# Patient Record
Sex: FEMALE | Race: WHITE | NOT HISPANIC OR LATINO | Employment: UNEMPLOYED | URBAN - NONMETROPOLITAN AREA
[De-identification: names, ages, dates, MRNs, and addresses within clinical notes are randomized per-mention and may not be internally consistent; named-entity substitution may affect disease eponyms.]

---

## 2023-06-05 DIAGNOSIS — J44.9 CHRONIC OBSTRUCTIVE PULMONARY DISEASE, UNSPECIFIED COPD TYPE (MULTI): ICD-10-CM

## 2023-06-05 DIAGNOSIS — K21.9 GASTROESOPHAGEAL REFLUX DISEASE, UNSPECIFIED WHETHER ESOPHAGITIS PRESENT: ICD-10-CM

## 2023-06-05 RX ORDER — PANTOPRAZOLE SODIUM 40 MG/1
40 TABLET, DELAYED RELEASE ORAL DAILY
COMMUNITY
End: 2023-06-05 | Stop reason: SDUPTHER

## 2023-06-05 RX ORDER — PANTOPRAZOLE SODIUM 40 MG/1
40 TABLET, DELAYED RELEASE ORAL DAILY
Qty: 90 TABLET | Refills: 1 | Status: SHIPPED | OUTPATIENT
Start: 2023-06-05 | End: 2024-01-16 | Stop reason: SDUPTHER

## 2023-06-05 RX ORDER — ALBUTEROL SULFATE 90 UG/1
2 AEROSOL, METERED RESPIRATORY (INHALATION) EVERY 4 HOURS PRN
Qty: 8.5 G | Refills: 0 | Status: SHIPPED | OUTPATIENT
Start: 2023-06-05 | End: 2024-01-16 | Stop reason: SDUPTHER

## 2023-08-14 PROBLEM — M25.50 MULTIPLE JOINT PAIN: Status: RESOLVED | Noted: 2023-08-14 | Resolved: 2023-08-14

## 2023-08-14 PROBLEM — N12 PYELONEPHRITIS: Status: RESOLVED | Noted: 2023-08-14 | Resolved: 2023-08-14

## 2023-08-14 PROBLEM — R10.9 ABDOMINAL PAIN: Status: RESOLVED | Noted: 2023-08-14 | Resolved: 2023-08-14

## 2023-08-14 PROBLEM — L23.9 ALLERGIC CONTACT DERMATITIS: Status: RESOLVED | Noted: 2023-08-14 | Resolved: 2023-08-14

## 2023-08-14 PROBLEM — M54.2 NECK PAIN: Status: RESOLVED | Noted: 2023-08-14 | Resolved: 2023-08-14

## 2023-08-14 PROBLEM — F17.200 TOBACCO DEPENDENCE: Status: ACTIVE | Noted: 2023-08-14

## 2023-08-14 PROBLEM — R91.8 MULTIPLE LUNG NODULES: Status: RESOLVED | Noted: 2023-08-14 | Resolved: 2023-08-14

## 2023-08-14 PROBLEM — N20.0 KIDNEY STONE ON LEFT SIDE: Status: RESOLVED | Noted: 2023-08-14 | Resolved: 2023-08-14

## 2023-08-14 PROBLEM — J44.9 CHRONIC OBSTRUCTIVE PULMONARY DISEASE (MULTI): Status: ACTIVE | Noted: 2023-08-14

## 2023-08-14 PROBLEM — R10.2 FEMALE PELVIC PAIN: Status: RESOLVED | Noted: 2023-08-14 | Resolved: 2023-08-14

## 2023-08-14 PROBLEM — M19.90 ARTHRITIS: Status: ACTIVE | Noted: 2023-08-14

## 2023-08-14 PROBLEM — L03.90 CELLULITIS: Status: RESOLVED | Noted: 2023-08-14 | Resolved: 2023-08-14

## 2023-08-14 PROBLEM — R25.1 TREMOR: Status: RESOLVED | Noted: 2023-08-14 | Resolved: 2023-08-14

## 2023-08-14 PROBLEM — R76.8 ANA POSITIVE: Status: ACTIVE | Noted: 2023-08-14

## 2023-08-14 PROBLEM — R42 DIZZINESS: Status: RESOLVED | Noted: 2023-08-14 | Resolved: 2023-08-14

## 2023-08-14 PROBLEM — R55 SYNCOPE: Status: RESOLVED | Noted: 2023-08-14 | Resolved: 2023-08-14

## 2023-08-14 PROBLEM — K21.9 GERD (GASTROESOPHAGEAL REFLUX DISEASE): Status: ACTIVE | Noted: 2023-08-14

## 2023-08-14 PROBLEM — N39.0 UTI (URINARY TRACT INFECTION): Status: RESOLVED | Noted: 2023-08-14 | Resolved: 2023-08-14

## 2023-08-14 PROBLEM — F17.200 SMOKER: Status: RESOLVED | Noted: 2023-08-14 | Resolved: 2023-08-14

## 2023-08-14 PROBLEM — K59.00 CONSTIPATION: Status: RESOLVED | Noted: 2023-08-14 | Resolved: 2023-08-14

## 2023-08-14 RX ORDER — FLUTICASONE FUROATE, UMECLIDINIUM BROMIDE AND VILANTEROL TRIFENATATE 100; 62.5; 25 UG/1; UG/1; UG/1
POWDER RESPIRATORY (INHALATION)
COMMUNITY
End: 2024-01-16 | Stop reason: SDUPTHER

## 2023-08-14 RX ORDER — DIVALPROEX SODIUM 125 MG/1
125 TABLET, DELAYED RELEASE ORAL 2 TIMES DAILY
COMMUNITY
End: 2024-03-13 | Stop reason: ALTCHOICE

## 2023-08-14 NOTE — PROGRESS NOTES
"Yas Prakash is a 58 y.o. female who presents for Follow-up (6 months; no longer on meloxicam, replaced with nabubetone; no concerns at this time; \"everything is the same\").    UTI, nephrolithiasis: She has followup with urology (Lynda) in September.    Shoulder pain: Right shoulder pain started a few months ago. She was having issues on the left so right sided issues are new for her. No recent falls, no lifting that she can recall.     Dizziness, syncope: She is planning to see cardio. She has seen Dr. Reyes but her testing is on hold because of issues with her daughter's pregnancy and her 's health.     GERD, hiatal hernia: She gets tightening her in her throat that radiates down to the stomach, sometimes it takes an hour or two to loosen. Started the pantoprazole a year and a half ago. She does get frequent throat clearing. She does also get globus sensation.     COPD: She is using albuterol prn. She is taking care of a few of her grandchildren so she smokes less around them.      Arthritis: 4mo ago went and saw Dr. Barbosa and got switched from meloxicam to nabumetone.      Tremor: Started about more than 3 years ago. Her mother had tremor, so did her grandmother although her grandmother's tremor is thought to be from head trauma.      All other systems have been reviewed and are negative for complaint     Objective   /75 (BP Location: Right arm, Patient Position: Sitting, BP Cuff Size: Large adult)   Pulse 88   Ht 1.575 m (5' 2\")   Wt 74.8 kg (164 lb 12.8 oz)   BMI 30.14 kg/m²     Gen: No acute distress, alert and oriented x3, pleasant   HEENT: moist mucous membranes, b/l external auditory canals are clear of debris, TMs within normal limits, no oropharyngeal lesions, eomi, perrla   Neck: thyroid within normal limits, no lymphadenopathy   CV: RRR, normal S1/S2, no murmur   Resp: Clear to auscultation bilaterally, no wheezes or rhonchi appreciated  Abd: soft, nontender, non-distended, no " guarding/rigidity, bowel sounds present  Extr: no edema, no calf tenderness  Derm: Skin is warm and dry, no rashes appreciated  Psych: mood is good, affect is congruent, good hygiene, normal speech and eye contact  Neuro: cranial nerves grossly intact, normal gait     Assessment/Plan     #Nephrolithiasis  She has followup with urology     #Wrist pain  #Arthritis:  doing reasonably well on meloxicam  concern for MCTD  Following with rheumatology  Currently on nabumetone  Has followup next month     #Dizziness  #Syncope  Resolved  Extensive testing for this was negative  Has followup with new neurologist    #Migraine   Started on depakote through neurology which is helping  Has followup with neurology in a month or two     #Tremor  some concern for parkinsonism, more likely essential tremor  Referring to neuro to discuss     #COPD:  Refilling albuterol today  she got bad SE's from chantix     #GERD:  Controlled on pantoprazole     HCM:  Flu vaccine today  UTD mammogram December  Lipid WNL  C-scope ready after clearance from neuro and cardio, referral placed today

## 2023-08-22 ENCOUNTER — OFFICE VISIT (OUTPATIENT)
Dept: PRIMARY CARE | Facility: CLINIC | Age: 58
End: 2023-08-22
Payer: COMMERCIAL

## 2023-08-22 VITALS
SYSTOLIC BLOOD PRESSURE: 111 MMHG | WEIGHT: 164.8 LBS | BODY MASS INDEX: 30.33 KG/M2 | DIASTOLIC BLOOD PRESSURE: 75 MMHG | HEIGHT: 62 IN | HEART RATE: 88 BPM

## 2023-08-22 DIAGNOSIS — Z12.11 SCREENING FOR COLON CANCER: ICD-10-CM

## 2023-08-22 DIAGNOSIS — Z23 ENCOUNTER FOR IMMUNIZATION: ICD-10-CM

## 2023-08-22 DIAGNOSIS — Z12.31 ENCOUNTER FOR SCREENING MAMMOGRAM FOR MALIGNANT NEOPLASM OF BREAST: Primary | ICD-10-CM

## 2023-08-22 PROCEDURE — 90471 IMMUNIZATION ADMIN: CPT | Performed by: FAMILY MEDICINE

## 2023-08-22 PROCEDURE — 99214 OFFICE O/P EST MOD 30 MIN: CPT | Performed by: FAMILY MEDICINE

## 2023-08-22 PROCEDURE — 90686 IIV4 VACC NO PRSV 0.5 ML IM: CPT | Performed by: FAMILY MEDICINE

## 2023-08-22 RX ORDER — CEFDINIR 300 MG/1
300 CAPSULE ORAL EVERY 12 HOURS
COMMUNITY
Start: 2023-06-27 | End: 2023-08-22 | Stop reason: ALTCHOICE

## 2023-08-22 RX ORDER — RIZATRIPTAN BENZOATE 10 MG/1
TABLET ORAL
COMMUNITY
Start: 2023-07-26 | End: 2024-03-13 | Stop reason: SDUPTHER

## 2023-08-22 RX ORDER — DOCUSATE SODIUM 100 MG/1
100 CAPSULE, LIQUID FILLED ORAL 2 TIMES DAILY
COMMUNITY
Start: 2023-06-27

## 2023-08-22 RX ORDER — MULTIVITAMIN
1 TABLET ORAL DAILY
COMMUNITY

## 2023-08-22 RX ORDER — NABUMETONE 750 MG/1
750 TABLET, FILM COATED ORAL 2 TIMES DAILY PRN
COMMUNITY
Start: 2023-08-13 | End: 2024-04-15 | Stop reason: SDUPTHER

## 2023-08-22 RX ORDER — NORTRIPTYLINE HYDROCHLORIDE 50 MG/1
CAPSULE ORAL
COMMUNITY
Start: 2023-07-25 | End: 2024-03-13 | Stop reason: SDUPTHER

## 2023-08-22 NOTE — PATIENT INSTRUCTIONS
Radiology:  Raz  : [phone]    Mammogram due after 12/29    General Surgery:  Everette Shepard () [phone]

## 2023-12-22 ENCOUNTER — APPOINTMENT (OUTPATIENT)
Dept: PRIMARY CARE | Facility: CLINIC | Age: 58
End: 2023-12-22
Payer: COMMERCIAL

## 2024-01-16 DIAGNOSIS — K21.9 GASTROESOPHAGEAL REFLUX DISEASE, UNSPECIFIED WHETHER ESOPHAGITIS PRESENT: ICD-10-CM

## 2024-01-16 DIAGNOSIS — J44.9 CHRONIC OBSTRUCTIVE PULMONARY DISEASE, UNSPECIFIED COPD TYPE (MULTI): ICD-10-CM

## 2024-01-16 RX ORDER — PANTOPRAZOLE SODIUM 40 MG/1
40 TABLET, DELAYED RELEASE ORAL DAILY
Qty: 90 TABLET | Refills: 1 | Status: SHIPPED | OUTPATIENT
Start: 2024-01-16 | End: 2024-03-13 | Stop reason: SDUPTHER

## 2024-01-16 RX ORDER — ALBUTEROL SULFATE 90 UG/1
2 AEROSOL, METERED RESPIRATORY (INHALATION) EVERY 4 HOURS PRN
Qty: 18 G | Refills: 5 | Status: SHIPPED | OUTPATIENT
Start: 2024-01-16 | End: 2024-01-23 | Stop reason: SDUPTHER

## 2024-01-16 RX ORDER — FLUTICASONE FUROATE, UMECLIDINIUM BROMIDE AND VILANTEROL TRIFENATATE 100; 62.5; 25 UG/1; UG/1; UG/1
POWDER RESPIRATORY (INHALATION)
Qty: 60 EACH | Refills: 5 | Status: SHIPPED | OUTPATIENT
Start: 2024-01-16 | End: 2024-01-23 | Stop reason: SDUPTHER

## 2024-01-23 ENCOUNTER — OFFICE VISIT (OUTPATIENT)
Dept: PULMONOLOGY | Facility: CLINIC | Age: 59
End: 2024-01-23
Payer: COMMERCIAL

## 2024-01-23 VITALS
HEART RATE: 96 BPM | DIASTOLIC BLOOD PRESSURE: 82 MMHG | SYSTOLIC BLOOD PRESSURE: 130 MMHG | BODY MASS INDEX: 30.97 KG/M2 | WEIGHT: 169.3 LBS | OXYGEN SATURATION: 96 %

## 2024-01-23 DIAGNOSIS — J44.9 CHRONIC OBSTRUCTIVE PULMONARY DISEASE, UNSPECIFIED COPD TYPE (MULTI): ICD-10-CM

## 2024-01-23 DIAGNOSIS — F17.200 TOBACCO DEPENDENCE: Primary | ICD-10-CM

## 2024-01-23 PROCEDURE — 99214 OFFICE O/P EST MOD 30 MIN: CPT | Performed by: NURSE PRACTITIONER

## 2024-01-23 RX ORDER — FLUTICASONE FUROATE, UMECLIDINIUM BROMIDE AND VILANTEROL TRIFENATATE 100; 62.5; 25 UG/1; UG/1; UG/1
POWDER RESPIRATORY (INHALATION)
Qty: 60 EACH | Refills: 5 | Status: SHIPPED | OUTPATIENT
Start: 2024-01-23 | End: 2024-03-13 | Stop reason: ALTCHOICE

## 2024-01-23 RX ORDER — GUAIFENESIN 600 MG/1
600 TABLET, EXTENDED RELEASE ORAL 2 TIMES DAILY
Qty: 60 TABLET | Refills: 6 | Status: SHIPPED | OUTPATIENT
Start: 2024-01-23

## 2024-01-23 RX ORDER — ALBUTEROL SULFATE 90 UG/1
2 AEROSOL, METERED RESPIRATORY (INHALATION) EVERY 4 HOURS PRN
Qty: 18 G | Refills: 11 | Status: SHIPPED | OUTPATIENT
Start: 2024-01-23 | End: 2025-01-22

## 2024-01-23 ASSESSMENT — ENCOUNTER SYMPTOMS
COUGH: 1
WHEEZING: 1

## 2024-01-23 NOTE — PROGRESS NOTES
Subjective   Patient ID: Yas Prakash is a 58 y.o. female who presents for No chief complaint on file..  HPI  HPI: New patient here today to establish care. Patient tells me she has had COPD before about 10 years. She used to see [facility] for her treatment. She tells me somewhere between 5 and 10 years ago she did have a bronchoscopy. We are going to call [facility] to get her records. Currently she is not using a maintenance medicine but she used to be on Symbicort. She states that she is short of breath with any exertion. I would like a PFT and a 6-minute walk. Patient has never been on oxygen. She does get frequent pneumonia. I would also like her to have a low-dose chest CT since she is a current smoker.    1/03/23 she is here today for follow-up. She had a PFT which shows an FEV1 of 68% predicted. She was unable to perform the DLCO maneuver. I started her on Trelegy at her last visit she is doing well with that she feels like it is made a difference in her breathing. She has albuterol to use for rescue. We did a chest CT which shows some very small nodules 3 and 4 mm on the left side.    07/10/23 She is here today for follow up. She is remolding her house and it is making her breathing worse. We talked about getting out of the house- when they are doing demo. She was in the ED, had a chest CT. She has kidney stones and was referred to urology. 10 minutes spent preparing patient's chart. 20 minutes spent with patient answering questions and determining care. 10 minutes spent charting and ordering tests and medications    1/23/24 she is still remolding the house but the prashanth parts are finished.         Previous pulmonary history:   She has no history of recurrent infections, or lung disease as a child. She was previously told she has COPD . She currently is on no supplemental oxygen. She has never been to pulmonary rehab. Does not recall having AECOPD requiring antibiotics or prednisone.     Inhalers/nebulized  medications: albuterol      Hospitalization History:  She has not been hospitalized over the last year for breathing related problem.     Sleep history:  Denies snoring, apneas, feeling tired during the day or taking naps during the day. Admits to feeling tired during the day.  Complains of snoring, ? Apneas. Feels tired during the day. Does not take frequent naps. Does not feel tired during the day or take frequent naps.  STOP-BANG score of 3     Comorbidities:    arthritis  GERD  Syncope    SH:  smoking: current  drinking: none  illicit drug use: none     Occupation: (Full questionnaire on exposures obtained, discussed with the patient and scanned to EMR)  worked as   No known exposure to asbestos, silica or beryllium     Family History:  No family history of lung diseases or cancer     Imaging history: (I have personally reviewed the imaging below)     PFTs:  // -> FEV1/FVC ratio/FEV1 (no BD response)/FVC/DLCO /TLC/RV to TLC ratio     6 MWTs: None on record     Review of Systems   Respiratory:  Positive for cough and wheezing.    All other systems reviewed and are negative.      Objective   Physical Exam  Vitals and nursing note reviewed.   Constitutional:       Appearance: Normal appearance. She is obese.   HENT:      Head: Normocephalic.      Nose: Nose normal.   Eyes:      Pupils: Pupils are equal, round, and reactive to light.   Pulmonary:      Effort: Pulmonary effort is normal.   Neurological:      General: No focal deficit present.      Mental Status: She is alert and oriented to person, place, and time. Mental status is at baseline.   Psychiatric:         Mood and Affect: Mood normal.         Behavior: Behavior normal.         Thought Content: Thought content normal.         Judgment: Judgment normal.         Assessment/Plan        # COPD with emphysema: found on PFT   - Patient states she was diagnosed about 10 years ago used to see [facility]. We will call to obtain records  -will screen for A1AT deficiency  in clinic today (genetic screen)  -FEV1 post-bd of , GOLD stage  -At baseline with no active sign of exacerbation (increased dyspnea, cough, sputum or change in sputum characteristic)  -Advanced therapies including LVRS and Lung transplantation.  -Counseled on the role of diet and exercise  -Pulmonary rehab referral made.  -Vaccinations: Yearly influenza vaccines, Pneumoccocal (both PCV13 and PPSV23 for all patients 65 y.o or above)  -Depression score.  -Sleep evaluation as below.  -Echo to evaluate for core pulmonale.  -Does not need oxygen at rest. Oxygen need evaluation with walking and sleep (nighttime oximetry)   -Used to be on Symbicort currently not using a maintenance medicine. I will try Trelegy and she has albuterol   -Order a PFT and 6-minute walk   - Continue the Trelegy 100 and albuterol   07/10/23 she continues with Trelegy, and uses albuterol several times a day right now because they are remodeling  1/23/24 She continues on Trelegy, uses albuterol about every day, depends on the weather. She needs refills today     #Lung nodules   - Small subcentimeter lung nodules noted on chest CT   - She is eligible for another low-dose chest CT screen in 1 year 10/24    # Smoking cessation:  -Counseled for 4 minutes.  -Patient is willing to quit.  -No stop date scheduled  -will readdress with each visit    - Patient is eligible for a low-dose chest CT screen    Mrs. Prakash it was a pleasure seeing you in the office today. We discussed the following:      - You are eligible for a low-dose chest CT in 1 year 10/24    - Please continue your Trelegy once a day   - Please use your albuterol for shortness of breath   - Please work on quitting smoking    Follow-up in 6 months        DOLORES Mcpherson-KAMALJIT 01/23/24 1:08 PM

## 2024-01-23 NOTE — PATIENT INSTRUCTIONS
Mrs. Prakash it was a pleasure seeing you in the office today. We discussed the following:      - You are eligible for a low-dose chest CT in 1 year 10/24    - Please continue your Trelegy once a day   - Please use your albuterol for shortness of breath   - Please work on quitting smoking    Follow-up in 6 months

## 2024-02-19 ENCOUNTER — HOSPITAL ENCOUNTER (OUTPATIENT)
Dept: RADIOLOGY | Facility: HOSPITAL | Age: 59
Discharge: HOME | End: 2024-02-19
Payer: COMMERCIAL

## 2024-02-19 DIAGNOSIS — Z12.31 ENCOUNTER FOR SCREENING MAMMOGRAM FOR MALIGNANT NEOPLASM OF BREAST: ICD-10-CM

## 2024-02-19 PROCEDURE — 77063 BREAST TOMOSYNTHESIS BI: CPT | Mod: BILATERAL PROCEDURE | Performed by: RADIOLOGY

## 2024-02-19 PROCEDURE — 77067 SCR MAMMO BI INCL CAD: CPT

## 2024-02-19 PROCEDURE — 77067 SCR MAMMO BI INCL CAD: CPT | Mod: BILATERAL PROCEDURE | Performed by: RADIOLOGY

## 2024-03-06 NOTE — PROGRESS NOTES
Yas Prakash is a 58 y.o. female who presents for Follow-up (4 months; she's having constipation and cramping, painful when she does go and has some blood; now seeing Dr Mclaughlin in place of Eric, has had some med changes)    Constipation: Going on for 4 months. She is having trouble having bowel movements. Sometimes she has to really push to have a BM. Feels like sharp needles. Stools are very hard. Sometimes has blood when she has a bowel movement. Mostly after wiping. She is only having a bowel movement once a week. She tried the chocolate ex lax without relief.     UTI, nephrolithiasis: She has followup with urology (Lynda) in September. She is on vesicare now for her bladder. Feels it is extremely helpful.     Shoulder pain: Right shoulder pain started a few months ago. She was having issues on the left so right sided issues are new for her. No recent falls, no lifting that she can recall.     Hadaches: She ran out of depakote a while ago. Hasn't gotten in with a new neurologist because Dr. Reyes left. Currently working on some dental issues, feels that is contributing to her HA. She is also getting HA triggered by certain hairstyles and her glasses rubbing on her nose. She was on nortriptyline as well at bedtime for anxiety/insomnia.      Dizziness, syncope: She is planning to see cardio. She has seen Dr. Reyes but her testing is on hold because of issues with her daughter's pregnancy and her 's health.      GERD, hiatal hernia: She is taking pantoprazole daily. It was initially very helpful but now symptoms are getting worse, pantoprazole is not working. She is getting chest tightness that is relieved with jeffrey seltzer plus.     COPD: She is using albuterol prn. She is taking care of a few of her grandchildren so she smokes less around them.      Arthritis: 4mo ago went and saw Dr. Barbosa and got switched from meloxicam to nabumetone. Nabumetone is helpful, she has followup next month.     "  Tremor: Started about more than 3 years ago. Her mother had tremor, so did her grandmother although her grandmother's tremor is thought to be from head trauma.      All other systems have been reviewed and are negative for complaint     Objective   /79 (BP Location: Left arm, Patient Position: Sitting, BP Cuff Size: Adult)   Pulse 82   Ht 1.575 m (5' 2\")   Wt 74.8 kg (165 lb)   BMI 30.18 kg/m²     Gen: No acute distress, alert and oriented x3, pleasant   HEENT: moist mucous membranes, b/l external auditory canals are clear of debris, TMs within normal limits, no oropharyngeal lesions, eomi, perrla   Neck: thyroid within normal limits, no lymphadenopathy   CV: RRR, normal S1/S2, no murmur   Resp: Clear to auscultation bilaterally, no wheezes or rhonchi appreciated  Abd: soft, diffuse mild tenderness to palpation, moderate stool burden noted  Extr: no edema, no calf tenderness  Derm: Skin is warm and dry, no rashes appreciated  Psych: mood is good, affect is congruent, good hygiene, normal speech and eye contact  Neuro: cranial nerves grossly intact, normal gait    Assessment/Plan     #Constipation  Rx sent miralax  If no improvement in 1 mo consider enema    #Nephrolithiasis  She has followup with urology    #OAB  On vesicare which is helping     #Wrist pain  #Arthritis:  doing reasonably well on meloxicam  concern for MCTD  Following with rheumatology  Currently on nabumetone  Has followup next month    #GERD:  Hiatal hernia:  Not controlled on PPI  Increasing to BID  If this doesn't control her symptoms we will have to have her see a surgeon     #Dizziness  #Syncope  Resolved  Extensive testing for this was negative  Has followup with new neurologist     #Migraine   Started on depakote through neurology which is helping  Has followup with neurology in a month or two     #Tremor  some concern for parkinsonism, more likely essential tremor  Referring to neuro to discuss     #COPD:  Refilling albuterol " today  she got bad SE's from chantix     HCM:  UTD for flu  Mammogram February WNL  Lipid WNL  C-scope ready after clearance from neuro and cardio, referral placed today

## 2024-03-13 ENCOUNTER — OFFICE VISIT (OUTPATIENT)
Dept: PRIMARY CARE | Facility: CLINIC | Age: 59
End: 2024-03-13
Payer: COMMERCIAL

## 2024-03-13 VITALS
WEIGHT: 165 LBS | DIASTOLIC BLOOD PRESSURE: 79 MMHG | BODY MASS INDEX: 30.36 KG/M2 | SYSTOLIC BLOOD PRESSURE: 140 MMHG | HEART RATE: 82 BPM | HEIGHT: 62 IN

## 2024-03-13 DIAGNOSIS — K21.9 GASTROESOPHAGEAL REFLUX DISEASE, UNSPECIFIED WHETHER ESOPHAGITIS PRESENT: ICD-10-CM

## 2024-03-13 DIAGNOSIS — K59.00 CONSTIPATION, UNSPECIFIED CONSTIPATION TYPE: ICD-10-CM

## 2024-03-13 DIAGNOSIS — G43.809 OTHER MIGRAINE WITHOUT STATUS MIGRAINOSUS, NOT INTRACTABLE: Primary | ICD-10-CM

## 2024-03-13 PROCEDURE — 99214 OFFICE O/P EST MOD 30 MIN: CPT | Performed by: FAMILY MEDICINE

## 2024-03-13 RX ORDER — BUDESONIDE, GLYCOPYRROLATE, AND FORMOTEROL FUMARATE 160; 9; 4.8 UG/1; UG/1; UG/1
AEROSOL, METERED RESPIRATORY (INHALATION) 2 TIMES DAILY
COMMUNITY

## 2024-03-13 RX ORDER — POLYETHYLENE GLYCOL 3350 17 G/17G
8.5 POWDER, FOR SOLUTION ORAL DAILY
Qty: 15 PACKET | Refills: 1 | Status: SHIPPED | OUTPATIENT
Start: 2024-03-13 | End: 2024-05-12

## 2024-03-13 RX ORDER — NORTRIPTYLINE HYDROCHLORIDE 50 MG/1
50 CAPSULE ORAL NIGHTLY
Qty: 90 CAPSULE | Refills: 3 | Status: SHIPPED | OUTPATIENT
Start: 2024-03-13 | End: 2025-03-13

## 2024-03-13 RX ORDER — RIZATRIPTAN BENZOATE 10 MG/1
10 TABLET ORAL ONCE AS NEEDED
Qty: 9 TABLET | Refills: 1 | Status: SHIPPED | OUTPATIENT
Start: 2024-03-13 | End: 2024-05-12

## 2024-03-13 RX ORDER — PANTOPRAZOLE SODIUM 40 MG/1
40 TABLET, DELAYED RELEASE ORAL 2 TIMES DAILY
Qty: 180 TABLET | Refills: 3 | Status: SHIPPED | OUTPATIENT
Start: 2024-03-13 | End: 2025-03-13

## 2024-03-13 NOTE — PATIENT INSTRUCTIONS
Gastroenterology:  Dontae Bauer ([facility]) [phone]  Dontae Maya ([facility]) [phone]

## 2024-04-15 ENCOUNTER — OFFICE VISIT (OUTPATIENT)
Dept: RHEUMATOLOGY | Facility: CLINIC | Age: 59
End: 2024-04-15
Payer: COMMERCIAL

## 2024-04-15 VITALS — BODY MASS INDEX: 30 KG/M2 | DIASTOLIC BLOOD PRESSURE: 78 MMHG | SYSTOLIC BLOOD PRESSURE: 126 MMHG | WEIGHT: 164 LBS

## 2024-04-15 DIAGNOSIS — R76.8 ANA POSITIVE: ICD-10-CM

## 2024-04-15 DIAGNOSIS — M19.90 ARTHRITIS: Primary | ICD-10-CM

## 2024-04-15 PROCEDURE — 99215 OFFICE O/P EST HI 40 MIN: CPT | Performed by: INTERNAL MEDICINE

## 2024-04-15 RX ORDER — NABUMETONE 750 MG/1
750 TABLET, FILM COATED ORAL 2 TIMES DAILY PRN
Qty: 180 TABLET | Refills: 1 | Status: SHIPPED | OUTPATIENT
Start: 2024-04-15

## 2024-04-15 NOTE — PROGRESS NOTES
"Recheck  OA  /  + CORAL  doing well     Poor historian  HPI - \"Pretty good, I guess\"  She has a lot of cramping, mare in her hands.  She says her primary just told her that she has a hernia ?hiatal hernia.  She has a lot of GI sx - her primary incr her PPI to PPI.  She states her primary told her that's what's causing her CP because things are lodging in her esophagus.  She sometimes has dysphagia.  She has not seen GI.  \"I knew I had a 50% change of the arthritis because it's genetic\"  She was taking nabumetone bid but ran out \"quite awhile ago\" and couldn't find our number.  (I gave her 30d supply with 3 refills 1 yr ago).  She takes excedrin, which doesn't help too much.  She says that her hands are swollen 0 she can tell because it's hard to get bracelets over her hands.  AM stiffness x 20 min.  +freq SOB - has COPD and see pulm.  She gets \"major migraines\" but doesn't see neuro any more because she retired.  She said that she was [facility] to [facility].   +dry mouth.  No mouth sores.  No raynauds.  She gets rashes on arms and lower abd \"right now it's scarring over.  I itched so bad, I can't handle pain, so at the slightest, I scratch so bad\"  \"I don't handle pain at all\"  It's unclear what the rash was like \"it's just a scar for the digging\"  \"it seem like I just sniff anything, I break out\"  She states her primary gave her a cream for this, but I cannot find it in her chart, nor can I find mention of rash in primary notes going back at least to 2022    PE  NAD  RRR no r/m/g  CTA  No edema  No rash  No synovitis  3 FM tender pts  + heberdons    A/P - OA and +CORAL without evidence of inflam arthritis or CTD.  She ran out of nabumetone months ago but states she couldn't get our phone # - Restart nabumetone  ?dysphagia - consider GI if sx incr  ?Rash - ?if she gets a rash, just has itching, just picks, etc.  No current rash.  Consider derm if rash recurs  Migraines - declined referral to neuro  Check " yearly labs  Reeval 6 mo or sooner PRN

## 2024-06-19 NOTE — PROGRESS NOTES
Subjective   Patient ID: Yas Prakash is a 59 y.o. female who presents for Follow-up (3 months; miralax is helping).    Constipation: Going on for 4 months. She is having trouble having bowel movements. Sometimes she has to really push to have a BM. Feels like sharp needles. Stools are very hard. Sometimes has blood when she has a bowel movement. Mostly after wiping. She is only having a bowel movement once a week. She tried the chocolate ex lax without relief.      UTI, nephrolithiasis: She has followup with urology (Lynda) in September. She is on vesicare now for her bladder. Feels it is extremely helpful.     Shoulder pain: Right shoulder pain started a few months ago. She was having issues on the left so right sided issues are new for her. No recent falls, no lifting that she can recall.      Hadaches: did well on depakote but neurologist left. She has riztriptan prn =. She gets 18 to 20 HA days per month. Rizatriptan prn is helpful (95 percent reduction) and excedrine helps take it the rest of the way.      Dizziness, syncope: She is planning to see cardio. She has seen Dr. Reyes but her testing is on hold because of issues with her daughter's pregnancy and her 's health.      GERD, hiatal hernia: She is taking pantoprazole daily. It was initially very helpful but now symptoms are getting worse, pantoprazole is not working. She is getting chest tightness that is relieved with jeffrey seltzer plus.     COPD: She is using albuterol prn. She is taking care of a few of her grandchildren so she smokes less around them.      Arthritis: 4mo ago went and saw Dr. Barbosa and got switched from meloxicam to nabumetone. Nabumetone is helpful, she has followup next month.      Tremor: Started about more than 3 years ago. Her mother had tremor, so did her grandmother although her grandmother's tremor is thought to be from head trauma.      All other systems have been reviewed and are negative for complaint  "    Objective   /87 (BP Location: Left arm, Patient Position: Sitting, BP Cuff Size: Adult)   Pulse 105   Ht 1.575 m (5' 2\")   Wt 74.8 kg (165 lb)   BMI 30.18 kg/m²     Gen: No acute distress, alert and oriented x3, pleasant   HEENT: moist mucous membranes, b/l external auditory canals are clear of debris, TMs within normal limits, no oropharyngeal lesions, eomi, perrla   Neck: thyroid within normal limits, no lymphadenopathy   CV: RRR, normal S1/S2, no murmur   Resp: Clear to auscultation bilaterally, no wheezes or rhonchi appreciated  Abd: soft, nontender, non-distended, no guarding/rigidity, bowel sounds present  Extr: no edema, no calf tenderness  Derm: Skin is warm and dry, no rashes appreciated  Psych: mood is good, affect is congruent, good hygiene, normal speech and eye contact  Neuro: cranial nerves grossly intact, normal gait    Assessment/Plan   #Constipation  Doing well on miralax     #Nephrolithiasis  She has followup with urology  She has testing upcoming for urology     #OAB  On vesicare which is helping     #Wrist pain  #Arthritis:  doing reasonably well on meloxicam  concern for MCTD  Following with Dr. Reyes  Currently on nabumetone  Has followup next month     #GERD:  Hiatal hernia:  Not controlled on PPI  Increasing to BID  If this doesn't control her symptoms we will have to have her see a surgeon     #Dizziness  #Syncope  Resolved  Extensive testing for this was negative  Has followup with new neurologist     #Migraine   She is taking rizatriptan prn which helps     #Tremor  some concern for parkinsonism, more likely essential tremor  Referring to neuro to discuss     #COPD:  Refilling albuterol today  she got bad SE's from chantix     HCM:  UTD for flu  Mammogram February WNL  Lipid WNL  C-scope ready after clearance from neuro and cardio, referral placed today  "

## 2024-06-25 ENCOUNTER — APPOINTMENT (OUTPATIENT)
Dept: PRIMARY CARE | Facility: CLINIC | Age: 59
End: 2024-06-25
Payer: COMMERCIAL

## 2024-06-25 VITALS
HEART RATE: 105 BPM | HEIGHT: 62 IN | BODY MASS INDEX: 30.36 KG/M2 | DIASTOLIC BLOOD PRESSURE: 87 MMHG | SYSTOLIC BLOOD PRESSURE: 128 MMHG | WEIGHT: 165 LBS

## 2024-06-25 DIAGNOSIS — K21.9 GASTROESOPHAGEAL REFLUX DISEASE, UNSPECIFIED WHETHER ESOPHAGITIS PRESENT: ICD-10-CM

## 2024-06-25 DIAGNOSIS — Z13.220 SCREENING FOR HYPERLIPIDEMIA: ICD-10-CM

## 2024-06-25 DIAGNOSIS — Z00.00 HEALTHCARE MAINTENANCE: Primary | ICD-10-CM

## 2024-06-25 PROCEDURE — 99214 OFFICE O/P EST MOD 30 MIN: CPT | Performed by: FAMILY MEDICINE

## 2024-06-25 RX ORDER — PANTOPRAZOLE SODIUM 40 MG/1
40 TABLET, DELAYED RELEASE ORAL 2 TIMES DAILY
Qty: 180 TABLET | Refills: 3 | Status: SHIPPED | OUTPATIENT
Start: 2024-06-25 | End: 2025-06-25

## 2024-07-09 ENCOUNTER — APPOINTMENT (OUTPATIENT)
Dept: PULMONOLOGY | Facility: CLINIC | Age: 59
End: 2024-07-09
Payer: COMMERCIAL

## 2024-07-16 ENCOUNTER — APPOINTMENT (OUTPATIENT)
Dept: PULMONOLOGY | Facility: CLINIC | Age: 59
End: 2024-07-16
Payer: COMMERCIAL

## 2024-07-16 DIAGNOSIS — G43.809 OTHER MIGRAINE WITHOUT STATUS MIGRAINOSUS, NOT INTRACTABLE: ICD-10-CM

## 2024-07-16 RX ORDER — RIZATRIPTAN BENZOATE 10 MG/1
10 TABLET ORAL ONCE AS NEEDED
Qty: 9 TABLET | Refills: 1 | Status: SHIPPED | OUTPATIENT
Start: 2024-07-16 | End: 2024-09-14

## 2024-08-07 DIAGNOSIS — J44.9 CHRONIC OBSTRUCTIVE PULMONARY DISEASE, UNSPECIFIED COPD TYPE (MULTI): ICD-10-CM

## 2024-08-07 RX ORDER — ALBUTEROL SULFATE 90 UG/1
INHALANT RESPIRATORY (INHALATION)
Qty: 8.5 G | Refills: 1 | Status: SHIPPED | OUTPATIENT
Start: 2024-08-07

## 2024-09-16 ENCOUNTER — LAB (OUTPATIENT)
Dept: LAB | Facility: LAB | Age: 59
End: 2024-09-16
Payer: COMMERCIAL

## 2024-09-16 DIAGNOSIS — Z13.220 SCREENING FOR HYPERLIPIDEMIA: ICD-10-CM

## 2024-09-16 DIAGNOSIS — Z00.00 HEALTHCARE MAINTENANCE: ICD-10-CM

## 2024-09-16 LAB
ALBUMIN SERPL BCP-MCNC: 4.5 G/DL (ref 3.4–5)
ALP SERPL-CCNC: 57 U/L (ref 33–110)
ALT SERPL W P-5'-P-CCNC: 26 U/L (ref 7–45)
ANION GAP SERPL CALC-SCNC: 11 MMOL/L (ref 10–20)
AST SERPL W P-5'-P-CCNC: 18 U/L (ref 9–39)
BASOPHILS # BLD AUTO: 0.06 X10*3/UL (ref 0–0.1)
BASOPHILS NFR BLD AUTO: 0.6 %
BILIRUB SERPL-MCNC: 0.4 MG/DL (ref 0–1.2)
BUN SERPL-MCNC: 10 MG/DL (ref 6–23)
CALCIUM SERPL-MCNC: 9.6 MG/DL (ref 8.6–10.3)
CHLORIDE SERPL-SCNC: 102 MMOL/L (ref 98–107)
CHOLEST SERPL-MCNC: 266 MG/DL (ref 0–199)
CHOLESTEROL/HDL RATIO: 3.9
CO2 SERPL-SCNC: 31 MMOL/L (ref 21–32)
CREAT SERPL-MCNC: 0.88 MG/DL (ref 0.5–1.05)
EGFRCR SERPLBLD CKD-EPI 2021: 76 ML/MIN/1.73M*2
EOSINOPHIL # BLD AUTO: 0.14 X10*3/UL (ref 0–0.7)
EOSINOPHIL NFR BLD AUTO: 1.5 %
ERYTHROCYTE [DISTWIDTH] IN BLOOD BY AUTOMATED COUNT: 12.8 % (ref 11.5–14.5)
GLUCOSE SERPL-MCNC: 72 MG/DL (ref 74–99)
HCT VFR BLD AUTO: 43.2 % (ref 36–46)
HDLC SERPL-MCNC: 67.8 MG/DL
HGB BLD-MCNC: 14.1 G/DL (ref 12–16)
IMM GRANULOCYTES # BLD AUTO: 0.03 X10*3/UL (ref 0–0.7)
IMM GRANULOCYTES NFR BLD AUTO: 0.3 % (ref 0–0.9)
LDLC SERPL CALC-MCNC: 180 MG/DL
LYMPHOCYTES # BLD AUTO: 2.99 X10*3/UL (ref 1.2–4.8)
LYMPHOCYTES NFR BLD AUTO: 31.9 %
MCH RBC QN AUTO: 29.4 PG (ref 26–34)
MCHC RBC AUTO-ENTMCNC: 32.6 G/DL (ref 32–36)
MCV RBC AUTO: 90 FL (ref 80–100)
MONOCYTES # BLD AUTO: 0.61 X10*3/UL (ref 0.1–1)
MONOCYTES NFR BLD AUTO: 6.5 %
NEUTROPHILS # BLD AUTO: 5.55 X10*3/UL (ref 1.2–7.7)
NEUTROPHILS NFR BLD AUTO: 59.2 %
NON HDL CHOLESTEROL: 198 MG/DL (ref 0–149)
NRBC BLD-RTO: 0 /100 WBCS (ref 0–0)
PLATELET # BLD AUTO: 263 X10*3/UL (ref 150–450)
POTASSIUM SERPL-SCNC: 4.1 MMOL/L (ref 3.5–5.3)
PROT SERPL-MCNC: 6.9 G/DL (ref 6.4–8.2)
RBC # BLD AUTO: 4.79 X10*6/UL (ref 4–5.2)
SODIUM SERPL-SCNC: 140 MMOL/L (ref 136–145)
TRIGL SERPL-MCNC: 91 MG/DL (ref 0–149)
VLDL: 18 MG/DL (ref 0–40)
WBC # BLD AUTO: 9.4 X10*3/UL (ref 4.4–11.3)

## 2024-09-16 PROCEDURE — 85025 COMPLETE CBC W/AUTO DIFF WBC: CPT

## 2024-09-16 PROCEDURE — 80053 COMPREHEN METABOLIC PANEL: CPT

## 2024-09-16 PROCEDURE — 80061 LIPID PANEL: CPT

## 2024-09-16 PROCEDURE — 36415 COLL VENOUS BLD VENIPUNCTURE: CPT

## 2024-09-17 ENCOUNTER — APPOINTMENT (OUTPATIENT)
Dept: PULMONOLOGY | Facility: CLINIC | Age: 59
End: 2024-09-17
Payer: COMMERCIAL

## 2024-09-17 VITALS
DIASTOLIC BLOOD PRESSURE: 81 MMHG | OXYGEN SATURATION: 98 % | HEART RATE: 93 BPM | WEIGHT: 159.6 LBS | BODY MASS INDEX: 29.19 KG/M2 | SYSTOLIC BLOOD PRESSURE: 123 MMHG

## 2024-09-17 DIAGNOSIS — F17.200 TOBACCO DEPENDENCE: Primary | ICD-10-CM

## 2024-09-17 DIAGNOSIS — J44.9 CHRONIC OBSTRUCTIVE PULMONARY DISEASE, UNSPECIFIED COPD TYPE (MULTI): ICD-10-CM

## 2024-09-17 PROCEDURE — 99214 OFFICE O/P EST MOD 30 MIN: CPT | Performed by: NURSE PRACTITIONER

## 2024-09-17 RX ORDER — BUDESONIDE, GLYCOPYRROLATE, AND FORMOTEROL FUMARATE 160; 9; 4.8 UG/1; UG/1; UG/1
2 AEROSOL, METERED RESPIRATORY (INHALATION) 2 TIMES DAILY
Qty: 10 G | Refills: 11 | Status: SHIPPED | OUTPATIENT
Start: 2024-09-17

## 2024-09-17 RX ORDER — ALBUTEROL SULFATE 90 UG/1
2 INHALANT RESPIRATORY (INHALATION) EVERY 4 HOURS PRN
Qty: 8.5 G | Refills: 11 | Status: SHIPPED | OUTPATIENT
Start: 2024-09-17

## 2024-09-17 NOTE — PROGRESS NOTES
Subjective   Patient ID: Yas Prakash is a 59 y.o. female who presents for No chief complaint on file..  HPI  HPI: New patient here today to establish care. Patient tells me she has had COPD before about 10 years. She used to see [facility] for her treatment. She tells me somewhere between 5 and 10 years ago she did have a bronchoscopy. We are going to call [facility] to get her records. Currently she is not using a maintenance medicine but she used to be on Symbicort. She states that she is short of breath with any exertion. I would like a PFT and a 6-minute walk. Patient has never been on oxygen. She does get frequent pneumonia. I would also like her to have a low-dose chest CT since she is a current smoker.    1/03/23 she is here today for follow-up. She had a PFT which shows an FEV1 of 68% predicted. She was unable to perform the DLCO maneuver. I started her on Trelegy at her last visit she is doing well with that she feels like it is made a difference in her breathing. She has albuterol to use for rescue. We did a chest CT which shows some very small nodules 3 and 4 mm on the left side.    07/10/23 She is here today for follow up. She is remolding her house and it is making her breathing worse. We talked about getting out of the house- when they are doing demo. She was in the ED, had a chest CT. She has kidney stones and was referred to urology. 10 minutes spent preparing patient's chart. 20 minutes spent with patient answering questions and determining care. 10 minutes spent charting and ordering tests and medications    1/23/24 she is still remolding the house but the prashanth parts are finished.     09/17/24 she is doing well. She likes the breztri better than the trelegy, will send in. She has some allergies and the humidity does bother her. She is down to 5 cigarettes         Previous pulmonary history:   She has no history of recurrent infections, or lung disease as a child. She was previously told she has COPD . She  currently is on no supplemental oxygen. She has never been to pulmonary rehab. Does not recall having AECOPD requiring antibiotics or prednisone.     Inhalers/nebulized medications: albuterol      Hospitalization History:  She has not been hospitalized over the last year for breathing related problem.     Sleep history:  Denies snoring, apneas, feeling tired during the day or taking naps during the day. Admits to feeling tired during the day.  Complains of snoring, ? Apneas. Feels tired during the day. Does not take frequent naps. Does not feel tired during the day or take frequent naps.  STOP-BANG score of 3     Comorbidities:    arthritis  GERD  Syncope    SH:  smoking: current  drinking: none  illicit drug use: none     Occupation: (Full questionnaire on exposures obtained, discussed with the patient and scanned to EMR)  worked as   No known exposure to asbestos, silica or beryllium     Family History:  No family history of lung diseases or cancer     Imaging history: (I have personally reviewed the imaging below)     PFTs:  // -> FEV1/FVC ratio/FEV1 (no BD response)/FVC/DLCO /TLC/RV to TLC ratio     6 MWTs: None on record     Review of Systems   All other systems reviewed and are negative.      Objective   Physical Exam  Vitals and nursing note reviewed.   Constitutional:       Appearance: Normal appearance. She is obese.   HENT:      Head: Normocephalic.      Nose: Nose normal.   Eyes:      Pupils: Pupils are equal, round, and reactive to light.   Cardiovascular:      Rate and Rhythm: Normal rate.   Pulmonary:      Effort: Pulmonary effort is normal.      Breath sounds: Normal breath sounds.   Neurological:      General: No focal deficit present.      Mental Status: She is alert and oriented to person, place, and time. Mental status is at baseline.   Psychiatric:         Mood and Affect: Mood normal.         Behavior: Behavior normal.         Thought Content: Thought content normal.         Judgment: Judgment  normal.         Assessment/Plan        # COPD with emphysema: found on PFT   - Patient states she was diagnosed about 10 years ago used to see [facility]. We will call to obtain records  -will screen for A1AT deficiency in clinic today (genetic screen)  -FEV1 post-bd of , GOLD stage  -At baseline with no active sign of exacerbation (increased dyspnea, cough, sputum or change in sputum characteristic)  -Advanced therapies including LVRS and Lung transplantation.  -Counseled on the role of diet and exercise  -Pulmonary rehab referral made.  -Vaccinations: Yearly influenza vaccines, Pneumoccocal (both PCV13 and PPSV23 for all patients 65 y.o or above)  -Depression score.  -Sleep evaluation as below.  -Echo to evaluate for core pulmonale.  -Does not need oxygen at rest. Oxygen need evaluation with walking and sleep (nighttime oximetry)   -Used to be on Symbicort currently not using a maintenance medicine. I will try Trelegy and she has albuterol   -Order a PFT and 6-minute walk   - Continue the Trelegy 100 and albuterol   07/10/23 she continues with Trelegy, and uses albuterol several times a day right now because they are remodeling  1/23/24 She continues on Trelegy, uses albuterol about every day, depends on the weather. She needs refills today   09/17/24 she has Breztri, which worked better than trelegy and albuterol she uses daily . She has allergies also and the weather changes do affect her breathing     #Lung nodules   - Small subcentimeter lung nodules noted on chest CT   - She is eligible for another low-dose chest CT screen in 1 year 10/24    # Smoking cessation:  -Counseled for 4 minutes.  -Patient is willing to quit.  -No stop date scheduled  -will readdress with each visit    - Patient is eligible for a low-dose chest CT screen  09/17/24 she is smoking about 5 cigarettes a day     Mrs. Prakash it was a pleasure seeing you in the office today. We discussed the following:      - You are eligible for a low-dose chest  CT in 1 year 10/24    - Please continue your Trelegy once a day   - Please use your albuterol for shortness of breath   - Please work on quitting smoking    Follow-up in 6 months      Follow up with Dr. Pineda with your LDCT and your COPD    DOLORES Mcpherson-KAMALJIT 09/17/24 2:42 PM

## 2024-09-19 ENCOUNTER — HOSPITAL ENCOUNTER (OUTPATIENT)
Dept: RADIOLOGY | Facility: HOSPITAL | Age: 59
Discharge: HOME | End: 2024-09-19
Payer: COMMERCIAL

## 2024-09-19 DIAGNOSIS — N32.81 OVERACTIVE BLADDER: ICD-10-CM

## 2024-09-19 DIAGNOSIS — N20.0 CALCULUS OF KIDNEY: ICD-10-CM

## 2024-09-19 PROCEDURE — 76770 US EXAM ABDO BACK WALL COMP: CPT

## 2024-09-20 NOTE — PROGRESS NOTES
"Subjective   Patient ID: Yas Prakash is a 59 y.o. female who presents for Follow-up (Review labs; flu shot done today).    Constipation: doing well on miralax.     UTI, nephrolithiasis: She has followup with urology (Lynda) in September. She is on vesicare now for her bladder. Feels it is extremely helpful.     Shoulder pain: Right shoulder pain started a few months ago. She was having issues on the left so right sided issues are new for her. No recent falls, no lifting that she can recall.      Hadaches: did well on depakote but neurologist left. She has riztriptan prn =. She gets 18 to 20 HA days per month. Rizatriptan prn is helpful (95 percent reduction) and excedrine helps take it the rest of the way.      Dizziness, syncope: She is planning to see cardio. She has seen Dr. Reyes but her testing is on hold because of issues with her daughter's pregnancy and her 's health.      GERD, hiatal hernia: She is taking pantoprazole daily. It was initially very helpful but now symptoms are getting worse, pantoprazole is not working. She is getting chest tightness that is relieved with jeffrey seltzer plus.     COPD: She is on breztri daily now. She is using albuterol prn, she has been using it a lot this summer. She is taking care of a few of her grandchildren so she smokes less around them. She has a CT next week.      Arthritis: 4mo ago went and saw Dr. Barbosa and got switched from meloxicam to nabumetone. Nabumetone is helpful, she has followup next month.      Tremor: Started about more than 3 years ago. Her mother had tremor, so did her grandmother although her grandmother's tremor is thought to be from head trauma.      All other systems have been reviewed and are negative for complaint     Objective   /80 (BP Location: Right arm, Patient Position: Sitting, BP Cuff Size: Adult)   Pulse 92   Ht 1.575 m (5' 2\")   Wt 72.6 kg (160 lb)   BMI 29.26 kg/m²     Gen: No acute distress, alert and " oriented x3, pleasant   HEENT: moist mucous membranes, b/l external auditory canals are clear of debris, TMs within normal limits, no oropharyngeal lesions, eomi, perrla   Neck: thyroid within normal limits, no lymphadenopathy   CV: RRR, normal S1/S2, no murmur   Resp: Clear to auscultation bilaterally, no wheezes or rhonchi appreciated  Abd: soft, nontender, non-distended, no guarding/rigidity, bowel sounds present  Extr: no edema, no calf tenderness  Derm: Skin is warm and dry, no rashes appreciated  Psych: mood is good, affect is congruent, good hygiene, normal speech and eye contact  Neuro: cranial nerves grossly intact, normal gait    Assessment/Plan   #Constipation  Doing well on miralax     #Nephrolithiasis  She has followup with urology  She has testing upcoming for urology     #OAB  On solifenacin 5mg daily which is helping     #Wrist pain  #Arthritis:  doing reasonably well on meloxicam  concern for MCTD  Following with Dr. Reyes  Currently on nabumetone  She is having a flare currently  Rx sent prednisone taper    #Hpot flashes  Rx sent venlafaxine  She will start after prednisone     #GERD:  Hiatal hernia:  Not controlled on PPI  Increasing to BID  If this doesn't control her symptoms we will have to have her see a surgeon     #Dizziness  #Syncope  Resolved  Extensive testing for this was negative  Has followup with new neurologist     #Migraine   She is taking rizatriptan prn which helps     #Tremor  some concern for parkinsonism, more likely essential tremor  Referring to neuro to discuss     #COPD:  Refilling albuterol today  she got bad SE's from [employer]     HCM:  UTD for flu  Mammogram February WNL  Lipid WNL  C-scope ready after clearance from neuro and cardio, referral placed today

## 2024-09-26 ENCOUNTER — APPOINTMENT (OUTPATIENT)
Dept: RADIOLOGY | Facility: HOSPITAL | Age: 59
End: 2024-09-26
Payer: COMMERCIAL

## 2024-09-27 ENCOUNTER — APPOINTMENT (OUTPATIENT)
Dept: PRIMARY CARE | Facility: CLINIC | Age: 59
End: 2024-09-27
Payer: COMMERCIAL

## 2024-09-27 VITALS
BODY MASS INDEX: 29.44 KG/M2 | DIASTOLIC BLOOD PRESSURE: 80 MMHG | HEIGHT: 62 IN | WEIGHT: 160 LBS | HEART RATE: 92 BPM | SYSTOLIC BLOOD PRESSURE: 134 MMHG

## 2024-09-27 DIAGNOSIS — M35.1 MIXED CONNECTIVE TISSUE DISEASE (MULTI): Primary | ICD-10-CM

## 2024-09-27 PROCEDURE — 99214 OFFICE O/P EST MOD 30 MIN: CPT | Performed by: FAMILY MEDICINE

## 2024-09-27 PROCEDURE — 3008F BODY MASS INDEX DOCD: CPT | Performed by: FAMILY MEDICINE

## 2024-09-27 RX ORDER — VENLAFAXINE HYDROCHLORIDE 37.5 MG/1
37.5 CAPSULE, EXTENDED RELEASE ORAL DAILY
Qty: 30 CAPSULE | Refills: 2 | Status: SHIPPED | OUTPATIENT
Start: 2024-09-27 | End: 2024-12-26

## 2024-09-27 RX ORDER — PREDNISONE 20 MG/1
TABLET ORAL
Qty: 12 TABLET | Refills: 0 | Status: SHIPPED | OUTPATIENT
Start: 2024-09-27

## 2024-09-30 DIAGNOSIS — M35.1 MIXED CONNECTIVE TISSUE DISEASE (MULTI): ICD-10-CM

## 2024-09-30 RX ORDER — PREDNISONE 20 MG/1
TABLET ORAL
Qty: 12 TABLET | Refills: 0 | Status: SHIPPED | OUTPATIENT
Start: 2024-09-30

## 2024-09-30 RX ORDER — VENLAFAXINE HYDROCHLORIDE 37.5 MG/1
37.5 CAPSULE, EXTENDED RELEASE ORAL DAILY
Qty: 30 CAPSULE | Refills: 2 | Status: SHIPPED | OUTPATIENT
Start: 2024-09-30 | End: 2024-12-29

## 2024-10-04 ENCOUNTER — HOSPITAL ENCOUNTER (OUTPATIENT)
Dept: RADIOLOGY | Facility: HOSPITAL | Age: 59
Discharge: HOME | End: 2024-10-04
Payer: COMMERCIAL

## 2024-10-04 ENCOUNTER — APPOINTMENT (OUTPATIENT)
Dept: RADIOLOGY | Facility: HOSPITAL | Age: 59
End: 2024-10-04
Payer: COMMERCIAL

## 2024-10-04 DIAGNOSIS — J44.9 CHRONIC OBSTRUCTIVE PULMONARY DISEASE, UNSPECIFIED COPD TYPE (MULTI): ICD-10-CM

## 2024-10-04 PROCEDURE — 71271 CT THORAX LUNG CANCER SCR C-: CPT

## 2024-11-27 DIAGNOSIS — M35.1 MIXED CONNECTIVE TISSUE DISEASE (MULTI): ICD-10-CM

## 2024-11-27 RX ORDER — VENLAFAXINE HYDROCHLORIDE 37.5 MG/1
37.5 CAPSULE, EXTENDED RELEASE ORAL DAILY
Qty: 30 CAPSULE | Refills: 2 | Status: SHIPPED | OUTPATIENT
Start: 2024-11-27

## 2024-12-06 DIAGNOSIS — M19.90 ARTHRITIS: ICD-10-CM

## 2024-12-06 RX ORDER — NABUMETONE 750 MG/1
750 TABLET, FILM COATED ORAL 2 TIMES DAILY PRN
Qty: 180 TABLET | Refills: 1 | Status: SHIPPED | OUTPATIENT
Start: 2024-12-06

## 2025-01-21 ENCOUNTER — APPOINTMENT (OUTPATIENT)
Dept: PULMONOLOGY | Facility: CLINIC | Age: 60
End: 2025-01-21
Payer: COMMERCIAL

## 2025-01-21 VITALS
DIASTOLIC BLOOD PRESSURE: 93 MMHG | WEIGHT: 163.4 LBS | HEART RATE: 89 BPM | BODY MASS INDEX: 29.89 KG/M2 | SYSTOLIC BLOOD PRESSURE: 141 MMHG | OXYGEN SATURATION: 99 %

## 2025-01-21 DIAGNOSIS — F17.200 TOBACCO DEPENDENCE: Primary | ICD-10-CM

## 2025-01-21 DIAGNOSIS — J44.9 CHRONIC OBSTRUCTIVE PULMONARY DISEASE, UNSPECIFIED COPD TYPE (MULTI): ICD-10-CM

## 2025-01-21 PROCEDURE — 99215 OFFICE O/P EST HI 40 MIN: CPT | Performed by: PEDIATRICS

## 2025-01-21 RX ORDER — BUDESONIDE, GLYCOPYRROLATE, AND FORMOTEROL FUMARATE 160; 9; 4.8 UG/1; UG/1; UG/1
2 AEROSOL, METERED RESPIRATORY (INHALATION) 2 TIMES DAILY
Qty: 10.7 G | Refills: 11 | Status: SHIPPED | OUTPATIENT
Start: 2025-01-21

## 2025-01-21 NOTE — PROGRESS NOTES
Subjective   Patient ID: Yas Prakash is a 59 y.o. female who presents for COPD    HPI    11/28/2022:  New patient here today to establish care. Patient tells me she has had COPD before about 10 years. She used to see [facility] for her treatment. She tells me somewhere between 5 and 10 years ago she did have a bronchoscopy. We are going to call [facility] to get her records. Currently she is not using a maintenance medicine but she used to be on Symbicort. She states that she is short of breath with any exertion. I would like a PFT and a 6-minute walk. Patient has never been on oxygen. She does get frequent pneumonia. I would also like her to have a low-dose chest CT since she is a current smoker.     1/03/23 she is here today for follow-up. She had a PFT which shows an FEV1 of 68% predicted. She was unable to perform the DLCO maneuver. I started her on Trelegy at her last visit she is doing well with that she feels like it is made a difference in her breathing. She has albuterol to use for rescue. We did a chest CT which shows some very small nodules 3 and 4 mm on the left side.     07/10/23 She is here today for follow up. She is remolding her house and it is making her breathing worse. We talked about getting out of the house- when they are doing demo. She was in the ED, had a chest CT. She has kidney stones and was referred to urology. 10 minutes spent preparing patient's chart. 20 minutes spent with patient answering questions and determining care. 10 minutes spent charting and ordering tests and medications     1/23/24 she is still remolding the house but the prashanth parts are finished.      09/17/24 she is doing well. She likes the breztri better than the trelegy, will send in. She has some allergies and the humidity does bother her. She is down to 5 cigarettes       1/21/2025:  Ms Prakash is doing well, she ran out of breztri so needs a new order.  She is still smoking abut 5 cigarettes a day.      Previous pulmonary  history:   She has no history of recurrent infections, or lung disease as a child. She was previously told she has COPD . She currently is on no supplemental oxygen. She has never been to pulmonary rehab. Does not recall having AECOPD requiring antibiotics or prednisone.     Inhalers/nebulized medications: albuterol      Hospitalization History:  She has not been hospitalized over the last year for breathing related problem.     Sleep history:  Denies snoring, apneas, feeling tired during the day or taking naps during the day. Admits to feeling tired during the day.  Complains of snoring, ? Apneas. Feels tired during the day. Does not take frequent naps. Does not feel tired during the day or take frequent naps.  STOP-BANG score of 3     Comorbidities:    arthritis  GERD  Syncope     SH:  smoking: current  drinking: none  illicit drug use: none     Occupation: (Full questionnaire on exposures obtained, discussed with the patient and scanned to EMR)  worked as   No known exposure to asbestos, silica or beryllium     Family History:  No family history of lung diseases or cancer     Imaging history: (I have personally reviewed the imaging below)     PFTs:  12/1/2022:  FVC 77%, FEV1 69%, FEF 25-75 55%, TLC 90%, RV/%     6 MWTs: None on record      Review of Systems    See scanned documents attached to this note for review of systems, and appropriate scales/scores for this visit.     Objective   Physical Exam  Constitutional:       Appearance: Normal appearance.   HENT:      Head: Normocephalic and atraumatic.      Mouth/Throat:      Pharynx: Oropharynx is clear.   Cardiovascular:      Rate and Rhythm: Normal rate and regular rhythm.      Pulses: Normal pulses.      Heart sounds: Normal heart sounds.   Pulmonary:      Effort: Pulmonary effort is normal.      Breath sounds: Normal breath sounds. No wheezing, rhonchi or rales.   Abdominal:      General: Bowel sounds are normal.      Palpations: Abdomen is soft.    Musculoskeletal:         General: Normal range of motion.   Skin:     General: Skin is warm and dry.   Neurological:      General: No focal deficit present.      Mental Status: She is alert and oriented to person, place, and time.   Psychiatric:         Mood and Affect: Mood normal.           Assessment/Plan     # COPD with emphysema: found on PFT   - Patient states she was diagnosed about 10 years ago used to see [facility].   07/10/23 she continues with Trelegy, and uses albuterol several times a day right now because they are remodeling  1/23/24 She continues on Trelegy, uses albuterol about every day, depends on the weather. She needs refills today   09/17/24 she has Breztri, which worked better than trelegy and albuterol she uses daily . She has allergies also and the weather changes do affect her breathing   1/21/2025: she ran out of breztri.  Sent new rx today.  Instructed to call if she runs out, we will order more for her.     #Lung nodules   - Small subcentimeter lung nodules noted on chest CT   - She is eligible for another low-dose chest CT screen in 1 year 10/24  1/21/2025:  repeat CT stable, next due on/after 10/5/2025      # Smoking cessation:  -Counseled for 4 minutes.  -Patient is willing to quit.  -No stop date scheduled  -will readdress with each visit    - Patient is eligible for a low-dose chest CT screen  09/17/24 she is smoking about 5 cigarettes a day   1/21/2025:  still smoking about 5 cigarettes a day        Follow-up in 6 months        Boo Pineda MD 01/21/25 9:41 AM

## 2025-01-21 NOTE — PROGRESS NOTES
Subjective   Patient ID: Yas Prakash is a 59 y.o. female who presents for Follow-up (4 months).    Arm pain: She is getting worsening left arm pain. Especially with standing or holding things in certain position her arms get tingling and numb. Starts in the upper arm and radiates down into the right forearm and thumb. Worse when she bends over to sweep the floor. She is having swelling as well, can't fit her bangles on the left side. Feels it her whole hand and fingers.     Constipation: doing well on miralax.     UTI, nephrolithiasis: She has followup with urology (Lynda) in September. She is on vesicare now for her bladder. Feels it is extremely helpful.     Shoulder pain: Right shoulder pain started a few months ago. She was having issues on the left so right sided issues are new for her. No recent falls, no lifting that she can recall.      Hadaches: did well on depakote but neurologist left. She has riztriptan prn =. She gets 18 to 20 HA days per month. Rizatriptan prn is helpful (95 percent reduction) and excedrine helps take it the rest of the way.      Dizziness, syncope: She is planning to see cardio. She has seen Dr. Reyes but her testing is on hold because of issues with her daughter's pregnancy and her 's health.      GERD, hiatal hernia: She is taking pantoprazole daily. It was initially very helpful but now symptoms are getting worse, pantoprazole is not working. She is getting chest tightness that is relieved with jeffrey seltzer plus.     COPD: She is on breztri daily now. She is using albuterol prn, she has been using it a lot this summer. She is taking care of a few of her grandchildren so she smokes less around them. She has a CT next week.      Arthritis: 4mo ago went and saw Dr. Barbosa and got switched from meloxicam to nabumetone. Nabumetone is helpful, she has followup next month.      Tremor: Started about more than 3 years ago. Her mother had tremor, so did her grandmother  "although her grandmother's tremor is thought to be from head trauma.      All other systems have been reviewed and are negative for complaint     Objective   /86 (BP Location: Left arm, Patient Position: Sitting, BP Cuff Size: Adult)   Pulse (!) 111   Ht 1.575 m (5' 2\")   Wt 74.4 kg (164 lb)   BMI 30.00 kg/m²     Gen: No acute distress, alert and oriented x3, pleasant   HEENT: moist mucous membranes, b/l external auditory canals are clear of debris, TMs within normal limits, no oropharyngeal lesions, eomi, perrla   Neck: thyroid within normal limits, no lymphadenopathy   CV: RRR, normal S1/S2, no murmur   Resp: Clear to auscultation bilaterally, no wheezes or rhonchi appreciated  Abd: soft, nontender, non-distended, no guarding/rigidity, bowel sounds present  Extr: no edema, no calf tenderness  Derm: Skin is warm and dry, no rashes appreciated  Psych: mood is good, affect is congruent, good hygiene, normal speech and eye contact  Neuro: cranial nerves grossly intact, normal gait    Assessment/Plan   #Cervical radiculopathy:  Check xrays    #Constipation  Doing well on miralax     #Nephrolithiasis  Established with urology (Lynda)  Ultrasound in September was clear     #OAB  On solifenacin 5mg daily which is helping     #Wrist pain  #Arthritis:  doing reasonably well on meloxicam  concern for MCTD  Following with Dr. Reyes  Currently on nabumetone     #Hot flashes  Well controlled on venlafaxine     #GERD:  #Hiatal hernia:  Controlled on pantoprazole BID     #Dizziness  #Syncope  Resolved  Extensive testing for this was negative  Has followup with new neurologist     #Migraine   She is taking rizatriptan prn which helps     #Tremor  some concern for parkinsonism, more likely essential tremor  Referring to neuro to discuss     #COPD:  Refilling albuterol today  she got bad SE's from [employer]     HCM:  UTD for flu  Mammogram February WNL  Lipid WNL  C-scope ready after clearance from neuro and cardio, " referral placed today

## 2025-01-27 ENCOUNTER — APPOINTMENT (OUTPATIENT)
Dept: PRIMARY CARE | Facility: CLINIC | Age: 60
End: 2025-01-27
Payer: COMMERCIAL

## 2025-01-27 VITALS
HEART RATE: 111 BPM | WEIGHT: 164 LBS | SYSTOLIC BLOOD PRESSURE: 137 MMHG | DIASTOLIC BLOOD PRESSURE: 86 MMHG | HEIGHT: 62 IN | BODY MASS INDEX: 30.18 KG/M2

## 2025-01-27 DIAGNOSIS — G43.809 OTHER MIGRAINE WITHOUT STATUS MIGRAINOSUS, NOT INTRACTABLE: ICD-10-CM

## 2025-01-27 DIAGNOSIS — M35.1 MIXED CONNECTIVE TISSUE DISEASE (MULTI): Primary | ICD-10-CM

## 2025-01-27 DIAGNOSIS — M54.12 CERVICAL RADICULOPATHY: ICD-10-CM

## 2025-01-27 DIAGNOSIS — K21.9 GASTROESOPHAGEAL REFLUX DISEASE, UNSPECIFIED WHETHER ESOPHAGITIS PRESENT: ICD-10-CM

## 2025-01-27 DIAGNOSIS — M19.90 ARTHRITIS: ICD-10-CM

## 2025-01-27 DIAGNOSIS — Z12.31 ENCOUNTER FOR SCREENING MAMMOGRAM FOR MALIGNANT NEOPLASM OF BREAST: ICD-10-CM

## 2025-01-27 PROCEDURE — 3008F BODY MASS INDEX DOCD: CPT | Performed by: FAMILY MEDICINE

## 2025-01-27 PROCEDURE — 99214 OFFICE O/P EST MOD 30 MIN: CPT | Performed by: FAMILY MEDICINE

## 2025-01-27 RX ORDER — SUCRALFATE 1 G/10ML
1 SUSPENSION ORAL 2 TIMES DAILY
Qty: 200 ML | Refills: 0 | Status: SHIPPED | OUTPATIENT
Start: 2025-01-27 | End: 2025-02-06

## 2025-01-27 RX ORDER — RIZATRIPTAN BENZOATE 10 MG/1
10 TABLET ORAL ONCE AS NEEDED
Qty: 9 TABLET | Refills: 3 | Status: SHIPPED | OUTPATIENT
Start: 2025-01-27 | End: 2025-03-28

## 2025-01-27 RX ORDER — NABUMETONE 750 MG/1
750 TABLET, FILM COATED ORAL 2 TIMES DAILY PRN
Qty: 180 TABLET | Refills: 2 | Status: SHIPPED | OUTPATIENT
Start: 2025-01-27

## 2025-01-27 RX ORDER — VENLAFAXINE HYDROCHLORIDE 37.5 MG/1
37.5 CAPSULE, EXTENDED RELEASE ORAL DAILY
Qty: 90 CAPSULE | Refills: 3 | Status: SHIPPED | OUTPATIENT
Start: 2025-01-27 | End: 2026-01-27

## 2025-01-27 RX ORDER — PANTOPRAZOLE SODIUM 40 MG/1
40 TABLET, DELAYED RELEASE ORAL 2 TIMES DAILY
Qty: 180 TABLET | Refills: 3 | Status: SHIPPED | OUTPATIENT
Start: 2025-01-27 | End: 2026-01-27

## 2025-01-27 RX ORDER — NORTRIPTYLINE HYDROCHLORIDE 50 MG/1
50 CAPSULE ORAL NIGHTLY
Qty: 90 CAPSULE | Refills: 3 | Status: SHIPPED | OUTPATIENT
Start: 2025-01-27 | End: 2026-01-27

## 2025-02-19 ENCOUNTER — HOSPITAL ENCOUNTER (OUTPATIENT)
Dept: RADIOLOGY | Facility: HOSPITAL | Age: 60
Discharge: HOME | End: 2025-02-19
Payer: COMMERCIAL

## 2025-02-19 VITALS — BODY MASS INDEX: 29.06 KG/M2 | HEIGHT: 63 IN | WEIGHT: 164 LBS

## 2025-02-19 DIAGNOSIS — M54.12 CERVICAL RADICULOPATHY: ICD-10-CM

## 2025-02-19 DIAGNOSIS — Z12.31 ENCOUNTER FOR SCREENING MAMMOGRAM FOR MALIGNANT NEOPLASM OF BREAST: ICD-10-CM

## 2025-02-19 PROCEDURE — 77067 SCR MAMMO BI INCL CAD: CPT | Performed by: RADIOLOGY

## 2025-02-19 PROCEDURE — 72050 X-RAY EXAM NECK SPINE 4/5VWS: CPT

## 2025-02-19 PROCEDURE — 77067 SCR MAMMO BI INCL CAD: CPT

## 2025-02-19 PROCEDURE — 77063 BREAST TOMOSYNTHESIS BI: CPT | Performed by: RADIOLOGY

## 2025-06-02 DIAGNOSIS — G43.809 OTHER MIGRAINE WITHOUT STATUS MIGRAINOSUS, NOT INTRACTABLE: ICD-10-CM

## 2025-06-02 RX ORDER — RIZATRIPTAN BENZOATE 10 MG/1
TABLET ORAL
Qty: 9 TABLET | Refills: 3 | Status: SHIPPED | OUTPATIENT
Start: 2025-06-02

## 2025-07-28 ENCOUNTER — APPOINTMENT (OUTPATIENT)
Dept: PRIMARY CARE | Facility: CLINIC | Age: 60
End: 2025-07-28
Payer: COMMERCIAL

## 2025-07-28 VITALS
WEIGHT: 168 LBS | HEART RATE: 97 BPM | SYSTOLIC BLOOD PRESSURE: 125 MMHG | DIASTOLIC BLOOD PRESSURE: 70 MMHG | BODY MASS INDEX: 30.14 KG/M2

## 2025-07-28 DIAGNOSIS — M54.12 CERVICAL RADICULOPATHY: Primary | ICD-10-CM

## 2025-07-28 DIAGNOSIS — Z13.220 SCREENING FOR HYPERLIPIDEMIA: ICD-10-CM

## 2025-07-28 DIAGNOSIS — Z00.00 HEALTHCARE MAINTENANCE: ICD-10-CM

## 2025-07-28 DIAGNOSIS — B35.1 ONYCHOMYCOSIS: ICD-10-CM

## 2025-07-28 PROCEDURE — 99214 OFFICE O/P EST MOD 30 MIN: CPT | Performed by: FAMILY MEDICINE

## 2025-07-28 RX ORDER — TERBINAFINE HYDROCHLORIDE 250 MG/1
250 TABLET ORAL DAILY
Qty: 90 TABLET | Refills: 0 | Status: SHIPPED | OUTPATIENT
Start: 2025-07-28 | End: 2025-10-26

## 2025-07-28 RX ORDER — METHYLPREDNISOLONE 4 MG/1
TABLET ORAL
Qty: 21 TABLET | Refills: 0 | Status: SHIPPED | OUTPATIENT
Start: 2025-07-28 | End: 2025-08-03

## 2025-07-28 RX ORDER — CYCLOBENZAPRINE HCL 10 MG
10 TABLET ORAL 3 TIMES DAILY PRN
Qty: 21 TABLET | Refills: 0 | Status: SHIPPED | OUTPATIENT
Start: 2025-07-28 | End: 2025-08-04

## 2025-07-28 NOTE — PROGRESS NOTES
"Subjective   Patient ID: Yas Prakash is a 60 y.o. female who presents for Follow-up (6 months; \"everything is about the same\").    HPI    Arm pain: She is getting worsening left arm pain. Especially with standing or holding things in certain position her arms get tingling and numb. Starts in the upper arm and radiates down into the right forearm and thumb. Worse when she bends over to sweep the floor. She is having swelling as well, can't fit her bangles on the left side. Feels it her whole hand and fingers. She completed xray. She is getting shooting pain into her hand, front and back, and all 5 fingers. Gets it even when she is looking at her phone.      Constipation: doing well on miralax.     UTI, nephrolithiasis: She has followup with urology (Lynda) in September. She is on vesicare now for her bladder. Feels it is extremely helpful.     Shoulder pain: Right shoulder pain started a few months ago. She was having issues on the left so right sided issues are new for her. No recent falls, no lifting that she can recall.      Hadaches: did well on depakote but neurologist left. She has riztriptan prn =. She gets 18 to 20 HA days per month. Rizatriptan prn is helpful (95 percent reduction) and excedrine helps take it the rest of the way.      Dizziness, syncope: She is planning to see cardio. She has seen Dr. Reyes but her testing is on hold because of issues with her daughter's pregnancy and her 's health.      GERD, hiatal hernia: She is taking pantoprazole daily. It was initially very helpful but now symptoms are getting worse, pantoprazole is not working. She is getting chest tightness that is relieved with jeffrey seltzer plus.     COPD: She is on breztri daily now. She is using albuterol prn, she has been using it a lot this summer. She is taking care of a few of her grandchildren so she smokes less around them. She has a CT next week.      Arthritis: 4mo ago went and saw Dr. Barbosa and got " switched from meloxicam to nabumetone. Nabumetone is helpful, she has followup next month.      Tremor: Started about more than 3 years ago. Her mother had tremor, so did her grandmother although her grandmother's tremor is thought to be from head trauma.    Review of systems completed and unremarkable other than what is documented in HPI.    Objective   BP (!) 141/92 (BP Location: Left arm, Patient Position: Sitting, BP Cuff Size: Large adult)   Pulse 97   Wt 76.2 kg (168 lb)   BMI 30.14 kg/m²     No data recorded    Physical Exam    Gen: No acute distress, alert and oriented x3, pleasant   HEENT: moist mucous membranes, b/l external auditory canals are clear of debris, TMs within normal limits, no oropharyngeal lesions, eomi, perrla   Neck: thyroid within normal limits, no lymphadenopathy   CV: RRR, normal S1/S2, no murmur   Resp: Clear to auscultation bilaterally, no wheezes or rhonchi appreciated  Abd: soft, nontender, non-distended, no guarding/rigidity, bowel sounds present  Extr: no edema, no calf tenderness  Derm: Skin is warm and dry, no rashes appreciated  Psych: mood is good, affect is congruent, good hygiene, normal speech and eye contact  Neuro: cranial nerves grossly intact, normal gait      Assessment/Plan         #Cervical radiculopathy:  Xray shows DDD worse on the left  Trial medrol pack and muscle relaxer  Ordering PT     #Constipation  Doing well on miralax     #Nephrolithiasis  Established with urology (Lynda)  Ultrasound in September was clear     #OAB  On solifenacin 5mg daily which is helping     #Wrist pain  #Arthritis:  doing reasonably well on meloxicam  concern for MCTD  Following with Dr. Reyes  Currently on nabumetone     #Hot flashes  Well controlled on venlafaxine     #GERD:  #Hiatal hernia:  Controlled on pantoprazole BID     #Dizziness  #Syncope  Resolved  Extensive testing for this was negative  Has followup with new neurologist     #Migraine   She is taking rizatriptan  prn which helps     #Tremor  some concern for parkinsonism, more likely essential tremor  Referring to neuro to discuss     #COPD:  Refilling albuterol today  she got bad SE's from [employer]ix     HCM:  UTD for flu  Mammogram February WNL  Lipid WNL  C-scope ready after clearance from neuro and cardio, referral placed today

## 2025-07-28 NOTE — PATIENT INSTRUCTIONS
PT/OT:  [facility] () [phone]  [facility] () [phone]  [facility]  [phone]  [facility] [phone]

## 2025-08-07 DIAGNOSIS — M19.90 ARTHRITIS: ICD-10-CM

## 2025-08-08 RX ORDER — NABUMENTONE 750 MG/1
TABLET ORAL
Qty: 180 TABLET | Refills: 2 | Status: SHIPPED | OUTPATIENT
Start: 2025-08-08

## 2025-08-12 ENCOUNTER — EVALUATION (OUTPATIENT)
Dept: PHYSICAL THERAPY | Facility: HOSPITAL | Age: 60
End: 2025-08-12
Payer: COMMERCIAL

## 2025-08-12 DIAGNOSIS — M47.22 RADICULOPATHY DUE TO CERVICAL SPONDYLOSIS AT MULTIPLE LEVELS: ICD-10-CM

## 2025-08-12 DIAGNOSIS — R29.898 WEAKNESS OF BOTH HANDS: ICD-10-CM

## 2025-08-12 DIAGNOSIS — R76.8 ANA POSITIVE: ICD-10-CM

## 2025-08-12 DIAGNOSIS — M54.2 CERVICALGIA: Primary | ICD-10-CM

## 2025-08-12 PROCEDURE — 97162 PT EVAL MOD COMPLEX 30 MIN: CPT | Mod: GP | Performed by: PHYSICAL THERAPIST

## 2025-08-12 PROCEDURE — 97530 THERAPEUTIC ACTIVITIES: CPT | Mod: GP | Performed by: PHYSICAL THERAPIST

## 2025-08-12 PROCEDURE — 97110 THERAPEUTIC EXERCISES: CPT | Mod: GP | Performed by: PHYSICAL THERAPIST

## 2025-08-12 SDOH — ECONOMIC STABILITY: FOOD INSECURITY: WITHIN THE PAST 12 MONTHS, YOU WORRIED THAT YOUR FOOD WOULD RUN OUT BEFORE YOU GOT MONEY TO BUY MORE.: NEVER TRUE

## 2025-08-12 SDOH — ECONOMIC STABILITY: FOOD INSECURITY: WITHIN THE PAST 12 MONTHS, THE FOOD YOU BOUGHT JUST DIDN'T LAST AND YOU DIDN'T HAVE MONEY TO GET MORE.: NEVER TRUE

## 2025-08-14 ENCOUNTER — TREATMENT (OUTPATIENT)
Dept: PHYSICAL THERAPY | Facility: HOSPITAL | Age: 60
End: 2025-08-14
Payer: COMMERCIAL

## 2025-08-14 DIAGNOSIS — R29.898 WEAKNESS OF BOTH HANDS: ICD-10-CM

## 2025-08-14 DIAGNOSIS — R76.8 ANA POSITIVE: ICD-10-CM

## 2025-08-14 DIAGNOSIS — M54.2 CERVICALGIA: ICD-10-CM

## 2025-08-14 DIAGNOSIS — M47.22 RADICULOPATHY DUE TO CERVICAL SPONDYLOSIS AT MULTIPLE LEVELS: ICD-10-CM

## 2025-08-14 PROCEDURE — 97110 THERAPEUTIC EXERCISES: CPT | Mod: GP,CQ

## 2025-08-14 PROCEDURE — 97140 MANUAL THERAPY 1/> REGIONS: CPT | Mod: GP,CQ

## 2025-08-18 ENCOUNTER — TREATMENT (OUTPATIENT)
Dept: PHYSICAL THERAPY | Facility: HOSPITAL | Age: 60
End: 2025-08-18
Payer: COMMERCIAL

## 2025-08-18 DIAGNOSIS — R76.8 ANA POSITIVE: ICD-10-CM

## 2025-08-18 DIAGNOSIS — R29.898 WEAKNESS OF BOTH HANDS: ICD-10-CM

## 2025-08-18 DIAGNOSIS — M54.2 CERVICALGIA: ICD-10-CM

## 2025-08-18 DIAGNOSIS — M47.22 RADICULOPATHY DUE TO CERVICAL SPONDYLOSIS AT MULTIPLE LEVELS: ICD-10-CM

## 2025-08-18 PROCEDURE — 97110 THERAPEUTIC EXERCISES: CPT | Mod: GP,CQ

## 2025-08-18 PROCEDURE — 97140 MANUAL THERAPY 1/> REGIONS: CPT | Mod: GP,CQ

## 2025-08-19 ENCOUNTER — APPOINTMENT (OUTPATIENT)
Dept: PULMONOLOGY | Facility: CLINIC | Age: 60
End: 2025-08-19
Payer: COMMERCIAL

## 2025-08-19 VITALS
WEIGHT: 174.3 LBS | HEART RATE: 89 BPM | DIASTOLIC BLOOD PRESSURE: 90 MMHG | BODY MASS INDEX: 31.27 KG/M2 | OXYGEN SATURATION: 97 % | SYSTOLIC BLOOD PRESSURE: 140 MMHG

## 2025-08-19 DIAGNOSIS — J44.9 CHRONIC OBSTRUCTIVE PULMONARY DISEASE, UNSPECIFIED COPD TYPE (MULTI): ICD-10-CM

## 2025-08-19 DIAGNOSIS — F17.210 CIGARETTE NICOTINE DEPENDENCE WITHOUT COMPLICATION: Primary | ICD-10-CM

## 2025-08-19 PROCEDURE — 99215 OFFICE O/P EST HI 40 MIN: CPT | Performed by: PEDIATRICS

## 2025-08-21 ENCOUNTER — TREATMENT (OUTPATIENT)
Dept: PHYSICAL THERAPY | Facility: HOSPITAL | Age: 60
End: 2025-08-21
Payer: COMMERCIAL

## 2025-08-21 DIAGNOSIS — M47.22 RADICULOPATHY DUE TO CERVICAL SPONDYLOSIS AT MULTIPLE LEVELS: ICD-10-CM

## 2025-08-21 DIAGNOSIS — R76.8 ANA POSITIVE: ICD-10-CM

## 2025-08-21 DIAGNOSIS — R29.898 WEAKNESS OF BOTH HANDS: ICD-10-CM

## 2025-08-21 DIAGNOSIS — M54.2 CERVICALGIA: ICD-10-CM

## 2025-08-21 PROCEDURE — 97140 MANUAL THERAPY 1/> REGIONS: CPT | Mod: GP,CQ

## 2025-08-21 PROCEDURE — 97110 THERAPEUTIC EXERCISES: CPT | Mod: GP,CQ

## 2025-08-22 LAB
ALBUMIN SERPL-MCNC: 4.6 G/DL (ref 3.6–5.1)
ALP SERPL-CCNC: 65 U/L (ref 37–153)
ALT SERPL-CCNC: 24 U/L (ref 6–29)
ANION GAP SERPL CALCULATED.4IONS-SCNC: 5 MMOL/L (CALC) (ref 7–17)
AST SERPL-CCNC: 19 U/L (ref 10–35)
BASOPHILS # BLD AUTO: 71 CELLS/UL (ref 0–200)
BASOPHILS NFR BLD AUTO: 1 %
BILIRUB SERPL-MCNC: 0.4 MG/DL (ref 0.2–1.2)
BUN SERPL-MCNC: 9 MG/DL (ref 7–25)
CALCIUM SERPL-MCNC: 9.1 MG/DL (ref 8.6–10.4)
CHLORIDE SERPL-SCNC: 105 MMOL/L (ref 98–110)
CHOLEST SERPL-MCNC: 252 MG/DL
CHOLEST/HDLC SERPL: 2.9 (CALC)
CO2 SERPL-SCNC: 29 MMOL/L (ref 20–32)
CREAT SERPL-MCNC: 0.83 MG/DL (ref 0.5–1.03)
EGFRCR SERPLBLD CKD-EPI 2021: 81 ML/MIN/1.73M2
EOSINOPHIL # BLD AUTO: 149 CELLS/UL (ref 15–500)
EOSINOPHIL NFR BLD AUTO: 2.1 %
ERYTHROCYTE [DISTWIDTH] IN BLOOD BY AUTOMATED COUNT: 14.5 % (ref 11–15)
GLUCOSE SERPL-MCNC: 76 MG/DL (ref 65–99)
HCT VFR BLD AUTO: 42.4 % (ref 35–45)
HDLC SERPL-MCNC: 86 MG/DL
HGB BLD-MCNC: 13.8 G/DL (ref 11.7–15.5)
LDLC SERPL CALC-MCNC: 145 MG/DL (CALC)
LYMPHOCYTES # BLD AUTO: 2442 CELLS/UL (ref 850–3900)
LYMPHOCYTES NFR BLD AUTO: 34.4 %
MCH RBC QN AUTO: 30.1 PG (ref 27–33)
MCHC RBC AUTO-ENTMCNC: 32.5 G/DL (ref 32–36)
MCV RBC AUTO: 92.4 FL (ref 80–100)
MONOCYTES # BLD AUTO: 483 CELLS/UL (ref 200–950)
MONOCYTES NFR BLD AUTO: 6.8 %
NEUTROPHILS # BLD AUTO: 3955 CELLS/UL (ref 1500–7800)
NEUTROPHILS NFR BLD AUTO: 55.7 %
NONHDLC SERPL-MCNC: 166 MG/DL (CALC)
PLATELET # BLD AUTO: 247 THOUSAND/UL (ref 140–400)
PMV BLD REES-ECKER: 9.2 FL (ref 7.5–12.5)
POTASSIUM SERPL-SCNC: 4.3 MMOL/L (ref 3.5–5.3)
PROT SERPL-MCNC: 7 G/DL (ref 6.1–8.1)
RBC # BLD AUTO: 4.59 MILLION/UL (ref 3.8–5.1)
SODIUM SERPL-SCNC: 139 MMOL/L (ref 135–146)
TRIGL SERPL-MCNC: 100 MG/DL
WBC # BLD AUTO: 7.1 THOUSAND/UL (ref 3.8–10.8)

## 2025-08-25 ENCOUNTER — TREATMENT (OUTPATIENT)
Dept: PHYSICAL THERAPY | Facility: HOSPITAL | Age: 60
End: 2025-08-25
Payer: COMMERCIAL

## 2025-08-25 DIAGNOSIS — R29.898 WEAKNESS OF BOTH HANDS: ICD-10-CM

## 2025-08-25 DIAGNOSIS — M54.2 CERVICALGIA: ICD-10-CM

## 2025-08-25 DIAGNOSIS — R76.8 ANA POSITIVE: ICD-10-CM

## 2025-08-25 DIAGNOSIS — M47.22 RADICULOPATHY DUE TO CERVICAL SPONDYLOSIS AT MULTIPLE LEVELS: ICD-10-CM

## 2025-08-25 PROCEDURE — 97140 MANUAL THERAPY 1/> REGIONS: CPT | Mod: GP | Performed by: PHYSICAL THERAPIST

## 2025-08-25 PROCEDURE — 97110 THERAPEUTIC EXERCISES: CPT | Mod: GP | Performed by: PHYSICAL THERAPIST

## 2025-08-27 ENCOUNTER — TREATMENT (OUTPATIENT)
Dept: PHYSICAL THERAPY | Facility: HOSPITAL | Age: 60
End: 2025-08-27
Payer: COMMERCIAL

## 2025-08-27 DIAGNOSIS — R29.898 WEAKNESS OF BOTH HANDS: ICD-10-CM

## 2025-08-27 DIAGNOSIS — M54.2 CERVICALGIA: ICD-10-CM

## 2025-08-27 DIAGNOSIS — R76.8 ANA POSITIVE: ICD-10-CM

## 2025-08-27 DIAGNOSIS — M47.22 RADICULOPATHY DUE TO CERVICAL SPONDYLOSIS AT MULTIPLE LEVELS: ICD-10-CM

## 2025-08-27 PROCEDURE — 97110 THERAPEUTIC EXERCISES: CPT | Mod: GP,CQ

## 2025-08-27 PROCEDURE — 97140 MANUAL THERAPY 1/> REGIONS: CPT | Mod: GP,CQ

## 2025-09-03 ENCOUNTER — TREATMENT (OUTPATIENT)
Dept: PHYSICAL THERAPY | Facility: HOSPITAL | Age: 60
End: 2025-09-03
Payer: COMMERCIAL

## 2025-09-03 DIAGNOSIS — R29.898 WEAKNESS OF BOTH HANDS: ICD-10-CM

## 2025-09-03 DIAGNOSIS — R76.8 ANA POSITIVE: ICD-10-CM

## 2025-09-03 DIAGNOSIS — M47.22 RADICULOPATHY DUE TO CERVICAL SPONDYLOSIS AT MULTIPLE LEVELS: ICD-10-CM

## 2025-09-03 DIAGNOSIS — M54.2 CERVICALGIA: ICD-10-CM

## 2025-09-03 PROCEDURE — 97110 THERAPEUTIC EXERCISES: CPT | Mod: GP

## 2025-09-03 PROCEDURE — 97140 MANUAL THERAPY 1/> REGIONS: CPT | Mod: GP

## 2025-09-05 ENCOUNTER — TREATMENT (OUTPATIENT)
Dept: PHYSICAL THERAPY | Facility: HOSPITAL | Age: 60
End: 2025-09-05
Payer: COMMERCIAL

## 2025-09-05 DIAGNOSIS — M47.22 RADICULOPATHY DUE TO CERVICAL SPONDYLOSIS AT MULTIPLE LEVELS: ICD-10-CM

## 2025-09-05 DIAGNOSIS — R76.8 ANA POSITIVE: ICD-10-CM

## 2025-09-05 DIAGNOSIS — R29.898 WEAKNESS OF BOTH HANDS: ICD-10-CM

## 2025-09-05 DIAGNOSIS — M54.2 CERVICALGIA: ICD-10-CM

## 2025-09-05 PROCEDURE — 97110 THERAPEUTIC EXERCISES: CPT | Mod: GP

## 2025-09-05 PROCEDURE — 97140 MANUAL THERAPY 1/> REGIONS: CPT | Mod: GP

## 2026-01-28 ENCOUNTER — APPOINTMENT (OUTPATIENT)
Dept: PRIMARY CARE | Facility: CLINIC | Age: 61
End: 2026-01-28
Payer: COMMERCIAL

## 2026-05-19 ENCOUNTER — APPOINTMENT (OUTPATIENT)
Dept: PULMONOLOGY | Facility: CLINIC | Age: 61
End: 2026-05-19
Payer: COMMERCIAL